# Patient Record
Sex: MALE | Race: WHITE | NOT HISPANIC OR LATINO
[De-identification: names, ages, dates, MRNs, and addresses within clinical notes are randomized per-mention and may not be internally consistent; named-entity substitution may affect disease eponyms.]

---
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
## 2022-03-03 ENCOUNTER — NON-APPOINTMENT (OUTPATIENT)
Age: 22
End: 2022-03-03

## 2023-12-14 NOTE — PATIENT PROFILE ADULT - SURGICAL SITE INCISION
Render In Strict Bullet Format?: No
Continue Regimen: Skyrizi as prescribed
Detail Level: Zone
Samples Given: Second dose of skyrizi given today in office (sample).  Patient pending approval and will notify me if not receiving medicine by time of next scheduled injection in 3 months.
no

## 2025-05-18 NOTE — DISCHARGE NOTE NURSING/CASE MANAGEMENT/SOCIAL WORK - NSDCPEELIQUISDIET_GEN_ALL_CORE
Eat healthy foods you enjoy. Apixaban/Eliquis DOES NOT have a special diet. Limit your alcohol intake.
Normal for race